# Patient Record
Sex: MALE | Race: OTHER | Employment: PART TIME | URBAN - NONMETROPOLITAN AREA
[De-identification: names, ages, dates, MRNs, and addresses within clinical notes are randomized per-mention and may not be internally consistent; named-entity substitution may affect disease eponyms.]

---

## 2017-08-05 ENCOUNTER — HOSPITAL ENCOUNTER (EMERGENCY)
Age: 47
Discharge: HOME | End: 2017-08-06
Payer: COMMERCIAL

## 2017-08-05 VITALS
TEMPERATURE: 98 F | HEART RATE: 121 BPM | DIASTOLIC BLOOD PRESSURE: 80 MMHG | OXYGEN SATURATION: 96 % | SYSTOLIC BLOOD PRESSURE: 165 MMHG | RESPIRATION RATE: 18 BRPM
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
## 2017-08-05 PROCEDURE — 99282 EMERGENCY DEPT VISIT SF MDM: CPT

## 2017-08-05 PROCEDURE — 6360000002 HC RX W HCPCS

## 2017-08-05 PROCEDURE — 96365 THER/PROPH/DIAG IV INF INIT: CPT

## 2017-08-05 PROCEDURE — 90715 TDAP VACCINE 7 YRS/> IM: CPT

## 2017-08-05 PROCEDURE — 90471 IMMUNIZATION ADMIN: CPT

## 2017-08-05 PROCEDURE — 2500000003 HC RX 250 WO HCPCS

## 2017-08-05 RX ORDER — CLINDAMYCIN PHOSPHATE 600 MG/50ML
INJECTION INTRAVENOUS
Status: DISCONTINUED
Start: 2017-08-05 | End: 2017-08-05 | Stop reason: WASHOUT

## 2017-08-05 RX ORDER — CLINDAMYCIN PHOSPHATE 900 MG/50ML
900 INJECTION INTRAVENOUS ONCE
Status: COMPLETED | OUTPATIENT
Start: 2017-08-05 | End: 2017-08-05

## 2017-08-05 RX ORDER — CLINDAMYCIN PHOSPHATE 900 MG/50ML
INJECTION INTRAVENOUS
Status: COMPLETED
Start: 2017-08-05 | End: 2017-08-05

## 2017-08-05 RX ADMIN — CLINDAMYCIN PHOSPHATE 900 MG: 900 INJECTION INTRAVENOUS at 23:27

## 2017-08-05 RX ADMIN — TETANUS TOXOID, REDUCED DIPHTHERIA TOXOID AND ACELLULAR PERTUSSIS VACCINE, ADSORBED 0.5 ML: 5; 2.5; 8; 8; 2.5 SUSPENSION INTRAMUSCULAR at 23:27

## 2017-08-05 RX ADMIN — CLINDAMYCIN PHOSPHATE 900 MG: 18 INJECTION, SOLUTION INTRAMUSCULAR; INTRAVENOUS at 23:27

## 2017-08-05 ASSESSMENT — PAIN SCALES - GENERAL: PAINLEVEL_OUTOF10: 0

## 2017-08-06 RX ORDER — CEPHALEXIN 500 MG/1
500 CAPSULE ORAL 4 TIMES DAILY
Qty: 40 CAPSULE | Refills: 0 | Status: SHIPPED | OUTPATIENT
Start: 2017-08-06 | End: 2017-08-16

## 2017-08-06 ASSESSMENT — ENCOUNTER SYMPTOMS
RHINORRHEA: 0
WHEEZING: 0
NAUSEA: 0
EYE DISCHARGE: 0
ABDOMINAL PAIN: 0
SHORTNESS OF BREATH: 0
EYE REDNESS: 0
SORE THROAT: 0
VOMITING: 0
BACK PAIN: 0
DIARRHEA: 0
COUGH: 0

## 2017-10-17 ENCOUNTER — APPOINTMENT (OUTPATIENT)
Dept: GENERAL RADIOLOGY | Age: 47
End: 2017-10-17
Payer: COMMERCIAL

## 2017-10-17 ENCOUNTER — HOSPITAL ENCOUNTER (EMERGENCY)
Age: 47
Discharge: HOME | End: 2017-10-17
Attending: EMERGENCY MEDICINE
Payer: COMMERCIAL

## 2017-10-17 ENCOUNTER — OFFICE VISIT (OUTPATIENT)
Dept: FAMILY MEDICINE CLINIC | Age: 47
End: 2017-10-17

## 2017-10-17 VITALS
TEMPERATURE: 98.3 F | WEIGHT: 162.8 LBS | RESPIRATION RATE: 14 BRPM | DIASTOLIC BLOOD PRESSURE: 68 MMHG | BODY MASS INDEX: 23.31 KG/M2 | SYSTOLIC BLOOD PRESSURE: 138 MMHG | HEIGHT: 70 IN | HEART RATE: 108 BPM

## 2017-10-17 VITALS
OXYGEN SATURATION: 99 % | BODY MASS INDEX: 25.05 KG/M2 | RESPIRATION RATE: 16 BRPM | DIASTOLIC BLOOD PRESSURE: 75 MMHG | HEART RATE: 106 BPM | WEIGHT: 175 LBS | TEMPERATURE: 97.5 F | SYSTOLIC BLOOD PRESSURE: 133 MMHG | HEIGHT: 70 IN

## 2017-10-17 DIAGNOSIS — E05.90 HYPERTHYROIDISM: ICD-10-CM

## 2017-10-17 DIAGNOSIS — A08.11 GASTROENTERITIS DUE TO NOROVIRUS: Primary | ICD-10-CM

## 2017-10-17 DIAGNOSIS — J30.1 ACUTE SEASONAL ALLERGIC RHINITIS DUE TO POLLEN: ICD-10-CM

## 2017-10-17 DIAGNOSIS — A08.11 NOROVIRUS: ICD-10-CM

## 2017-10-17 DIAGNOSIS — E05.90 HYPERTHYROIDISM: Primary | ICD-10-CM

## 2017-10-17 LAB
ADENOVIRUS F 40 41 PCR: NOT DETECTED
ALBUMIN SERPL-MCNC: 3.5 G/DL (ref 3.5–5.1)
ALP BLD-CCNC: 230 U/L (ref 38–126)
ALT SERPL-CCNC: 19 U/L (ref 11–66)
AMPHETAMINE+METHAMPHETAMINE URINE SCREEN: NEGATIVE
ANION GAP SERPL CALCULATED.3IONS-SCNC: 10 MEQ/L (ref 8–16)
AST SERPL-CCNC: 18 U/L (ref 5–40)
ASTROVIRUS PCR: NOT DETECTED
BACTERIA: ABNORMAL /HPF
BARBITURATE QUANTITATIVE URINE: NEGATIVE
BASOPHILS # BLD: 0.1 %
BASOPHILS ABSOLUTE: 0 THOU/MM3 (ref 0–0.1)
BENZODIAZEPINE QUANTITATIVE URINE: NEGATIVE
BILIRUB SERPL-MCNC: 0.6 MG/DL (ref 0.3–1.2)
BILIRUBIN DIRECT: < 0.2 MG/DL (ref 0–0.3)
BILIRUBIN URINE: NEGATIVE
BLOOD, URINE: NEGATIVE
BUN BLDV-MCNC: 8 MG/DL (ref 7–22)
CALCIUM SERPL-MCNC: 9.4 MG/DL (ref 8.5–10.5)
CAMPYLOBACTER PCR: NOT DETECTED
CANNABINOID QUANTITATIVE URINE: NEGATIVE
CASTS 2: ABNORMAL /LPF
CASTS UA: ABNORMAL /LPF
CHARACTER, URINE: CLEAR
CHLORIDE BLD-SCNC: 103 MEQ/L (ref 98–111)
CLOSTRIDIUM DIFFICILE, PCR: NOT DETECTED
CO2: 25 MEQ/L (ref 23–33)
COCAINE METABOLITE QUANTITATIVE URINE: NEGATIVE
COLOR: ABNORMAL
CREAT SERPL-MCNC: 0.6 MG/DL (ref 0.4–1.2)
CRYPTOSPORIDIUM PCR: NOT DETECTED
CRYSTALS, UA: ABNORMAL
CYCLOSPORA CAYETANENSIS PCR: NOT DETECTED
E COLI 0157 PCR: ABNORMAL
E COLI ENTEROAGGREGATIVE PCR: NOT DETECTED
E COLI ENTEROPATHOGENIC PCR: NOT DETECTED
E COLI ENTEROTOXIGENIC PCR: NOT DETECTED
E COLI SHIGA LIKE TOXIN PCR: NOT DETECTED
E COLI SHIGELLA/ENTEROINVASIVE PCR: NOT DETECTED
E HISTOLYTICA GI FILM ARRAY: NOT DETECTED
EKG ATRIAL RATE: 121 BPM
EKG P AXIS: 58 DEGREES
EKG P-R INTERVAL: 182 MS
EKG Q-T INTERVAL: 304 MS
EKG QRS DURATION: 86 MS
EKG QTC CALCULATION (BAZETT): 431 MS
EKG R AXIS: 77 DEGREES
EKG T AXIS: 55 DEGREES
EKG VENTRICULAR RATE: 121 BPM
EOSINOPHIL # BLD: 2.8 %
EOSINOPHILS ABSOLUTE: 0.2 THOU/MM3 (ref 0–0.4)
EPITHELIAL CELLS, UA: ABNORMAL /HPF
ETHYL ALCOHOL, SERUM: < 0.01 %
FLU A ANTIGEN: NEGATIVE
FLU B ANTIGEN: NEGATIVE
GFR SERPL CREATININE-BSD FRML MDRD: > 90 ML/MIN/1.73M2
GIARDIA LAMBLIA PCR: NOT DETECTED
GLUCOSE BLD-MCNC: 119 MG/DL (ref 70–108)
GLUCOSE URINE: NEGATIVE MG/DL
HCT VFR BLD CALC: 39.6 % (ref 42–52)
HEMOGLOBIN: 14 GM/DL (ref 14–18)
KETONES, URINE: NEGATIVE
LEUKOCYTE ESTERASE, URINE: NEGATIVE
LYMPHOCYTES # BLD: 15.7 %
LYMPHOCYTES ABSOLUTE: 1.1 THOU/MM3 (ref 1–4.8)
MCH RBC QN AUTO: 29.3 PG (ref 27–31)
MCHC RBC AUTO-ENTMCNC: 35.4 GM/DL (ref 33–37)
MCV RBC AUTO: 83 FL (ref 80–94)
MISCELLANEOUS 2: ABNORMAL
MONOCYTES # BLD: 13.1 %
MONOCYTES ABSOLUTE: 0.9 THOU/MM3 (ref 0.4–1.3)
NITRITE, URINE: NEGATIVE
NOROVIRUS GI GII PCR: DETECTED
NUCLEATED RED BLOOD CELLS: 0 /100 WBC
OPIATES, URINE: NEGATIVE
OSMOLALITY CALCULATION: 275.1 MOSMOL/KG (ref 275–300)
OXYCODONE: NEGATIVE
PDW BLD-RTO: 13.4 % (ref 11.5–14.5)
PH UA: 5
PHENCYCLIDINE QUANTITATIVE URINE: NEGATIVE
PLATELET # BLD: 195 THOU/MM3 (ref 130–400)
PLESIOMONAS SHIGELLOIDES PCR: NOT DETECTED
PMV BLD AUTO: 8.1 MCM (ref 7.4–10.4)
POTASSIUM SERPL-SCNC: 4.1 MEQ/L (ref 3.5–5.2)
PROTEIN UA: NEGATIVE
RBC # BLD: 4.77 MILL/MM3 (ref 4.7–6.1)
RBC # BLD: NORMAL 10*6/UL
RBC URINE: ABNORMAL /HPF
RENAL EPITHELIAL, UA: ABNORMAL
ROTAVIRUS A PCR: NOT DETECTED
SALMONELLA PCR: NOT DETECTED
SAPOVIRUS PCR: NOT DETECTED
SEDIMENTATION RATE, ERYTHROCYTE: 18 MM/HR (ref 0–10)
SEG NEUTROPHILS: 68.3 %
SEGMENTED NEUTROPHILS ABSOLUTE COUNT: 4.6 THOU/MM3 (ref 1.8–7.7)
SODIUM BLD-SCNC: 138 MEQ/L (ref 135–145)
SPECIFIC GRAVITY, URINE: 1.02 (ref 1–1.03)
T3 FREE: 21.52 PG/ML (ref 2.02–4.43)
T4 FREE: 7.06 NG/DL (ref 0.93–1.76)
TOTAL CK: 39 U/L (ref 55–170)
TOTAL PROTEIN: 6.4 G/DL (ref 6.1–8)
TSH SERPL DL<=0.05 MIU/L-ACNC: 0.01 UIU/ML (ref 0.4–4.2)
UROBILINOGEN, URINE: 0.2 EU/DL
VIBRIO CHOLERAE PCR: NOT DETECTED
VIBRIO PCR: NOT DETECTED
WBC # BLD: 6.7 THOU/MM3 (ref 4.8–10.8)
WBC UA: ABNORMAL /HPF
YEAST: ABNORMAL
YERSINIA ENTEROCOLITICA PCR: NOT DETECTED

## 2017-10-17 PROCEDURE — 84439 ASSAY OF FREE THYROXINE: CPT

## 2017-10-17 PROCEDURE — 84481 FREE ASSAY (FT-3): CPT

## 2017-10-17 PROCEDURE — 85025 COMPLETE CBC W/AUTO DIFF WBC: CPT

## 2017-10-17 PROCEDURE — 85651 RBC SED RATE NONAUTOMATED: CPT

## 2017-10-17 PROCEDURE — 99284 EMERGENCY DEPT VISIT MOD MDM: CPT

## 2017-10-17 PROCEDURE — 74000 XR ABDOMEN LIMITED (KUB): CPT

## 2017-10-17 PROCEDURE — 93005 ELECTROCARDIOGRAM TRACING: CPT

## 2017-10-17 PROCEDURE — 96374 THER/PROPH/DIAG INJ IV PUSH: CPT

## 2017-10-17 PROCEDURE — 87507 IADNA-DNA/RNA PROBE TQ 12-25: CPT

## 2017-10-17 PROCEDURE — 84445 ASSAY OF TSI GLOBULIN: CPT

## 2017-10-17 PROCEDURE — 82550 ASSAY OF CK (CPK): CPT

## 2017-10-17 PROCEDURE — 99203 OFFICE O/P NEW LOW 30 MIN: CPT | Performed by: EMERGENCY MEDICINE

## 2017-10-17 PROCEDURE — 96361 HYDRATE IV INFUSION ADD-ON: CPT

## 2017-10-17 PROCEDURE — 80307 DRUG TEST PRSMV CHEM ANLYZR: CPT

## 2017-10-17 PROCEDURE — 2580000003 HC RX 258: Performed by: PHYSICIAN ASSISTANT

## 2017-10-17 PROCEDURE — 96375 TX/PRO/DX INJ NEW DRUG ADDON: CPT

## 2017-10-17 PROCEDURE — 93005 ELECTROCARDIOGRAM TRACING: CPT | Performed by: EMERGENCY MEDICINE

## 2017-10-17 PROCEDURE — G0480 DRUG TEST DEF 1-7 CLASSES: HCPCS

## 2017-10-17 PROCEDURE — 81001 URINALYSIS AUTO W/SCOPE: CPT

## 2017-10-17 PROCEDURE — 84443 ASSAY THYROID STIM HORMONE: CPT

## 2017-10-17 PROCEDURE — 87804 INFLUENZA ASSAY W/OPTIC: CPT

## 2017-10-17 PROCEDURE — 80053 COMPREHEN METABOLIC PANEL: CPT

## 2017-10-17 PROCEDURE — 36415 COLL VENOUS BLD VENIPUNCTURE: CPT

## 2017-10-17 PROCEDURE — 2500000003 HC RX 250 WO HCPCS: Performed by: PHYSICIAN ASSISTANT

## 2017-10-17 PROCEDURE — 6360000002 HC RX W HCPCS: Performed by: PHYSICIAN ASSISTANT

## 2017-10-17 PROCEDURE — 82248 BILIRUBIN DIRECT: CPT

## 2017-10-17 PROCEDURE — 73564 X-RAY EXAM KNEE 4 OR MORE: CPT

## 2017-10-17 RX ORDER — METOPROLOL TARTRATE 50 MG/1
25 TABLET, FILM COATED ORAL ONCE
Status: DISCONTINUED | OUTPATIENT
Start: 2017-10-17 | End: 2017-10-17

## 2017-10-17 RX ORDER — 0.9 % SODIUM CHLORIDE 0.9 %
1000 INTRAVENOUS SOLUTION INTRAVENOUS ONCE
Status: COMPLETED | OUTPATIENT
Start: 2017-10-17 | End: 2017-10-17

## 2017-10-17 RX ORDER — KETOROLAC TROMETHAMINE 30 MG/ML
30 INJECTION, SOLUTION INTRAMUSCULAR; INTRAVENOUS ONCE
Status: COMPLETED | OUTPATIENT
Start: 2017-10-17 | End: 2017-10-17

## 2017-10-17 RX ORDER — METOPROLOL TARTRATE 5 MG/5ML
5 INJECTION INTRAVENOUS ONCE
Status: COMPLETED | OUTPATIENT
Start: 2017-10-17 | End: 2017-10-17

## 2017-10-17 RX ADMIN — KETOROLAC TROMETHAMINE 30 MG: 30 INJECTION, SOLUTION INTRAMUSCULAR at 09:49

## 2017-10-17 RX ADMIN — SODIUM CHLORIDE 1000 ML: 9 INJECTION, SOLUTION INTRAVENOUS at 06:51

## 2017-10-17 RX ADMIN — METOPROLOL TARTRATE 5 MG: 5 INJECTION INTRAVENOUS at 15:03

## 2017-10-17 ASSESSMENT — ENCOUNTER SYMPTOMS
TROUBLE SWALLOWING: 0
VOMITING: 0
EYE REDNESS: 0
EYE DISCHARGE: 0
SHORTNESS OF BREATH: 0
SINUS PRESSURE: 0
SORE THROAT: 0
SHORTNESS OF BREATH: 0
NAUSEA: 0
WHEEZING: 0
SORE THROAT: 0
BACK PAIN: 0
NAUSEA: 0
COUGH: 0
ABDOMINAL PAIN: 0
CHEST TIGHTNESS: 0
WHEEZING: 0
BACK PAIN: 0
VOICE CHANGE: 0
RHINORRHEA: 0
DIARRHEA: 1
DIARRHEA: 1
ABDOMINAL PAIN: 0
VOMITING: 0
COUGH: 0
CONSTIPATION: 0
RHINORRHEA: 0

## 2017-10-17 ASSESSMENT — PAIN SCALES - GENERAL
PAINLEVEL_OUTOF10: 9
PAINLEVEL_OUTOF10: 6
PAINLEVEL_OUTOF10: 4

## 2017-10-17 ASSESSMENT — PAIN DESCRIPTION - DESCRIPTORS: DESCRIPTORS: ACHING

## 2017-10-17 ASSESSMENT — PATIENT HEALTH QUESTIONNAIRE - PHQ9
2. FEELING DOWN, DEPRESSED OR HOPELESS: 0
SUM OF ALL RESPONSES TO PHQ QUESTIONS 1-9: 0
1. LITTLE INTEREST OR PLEASURE IN DOING THINGS: 0
SUM OF ALL RESPONSES TO PHQ9 QUESTIONS 1 & 2: 0

## 2017-10-17 ASSESSMENT — PAIN DESCRIPTION - ORIENTATION: ORIENTATION: RIGHT

## 2017-10-17 ASSESSMENT — PAIN DESCRIPTION - PAIN TYPE: TYPE: ACUTE PAIN

## 2017-10-17 ASSESSMENT — PAIN DESCRIPTION - LOCATION: LOCATION: KNEE

## 2017-10-17 NOTE — ED NOTES
Pt voided, no cath done d/t urine being collected via clean catch.       Cornelius Mantilla RN  10/17/17 7583

## 2017-10-17 NOTE — PROGRESS NOTES
Medications Prescribed:  No orders of the defined types were placed in this encounter. Orders Placed:  Orders Placed This Encounter   Procedures    US THYROID     Standing Status:   Future     Standing Expiration Date:   10/17/2018    NM Thyroid Uptake and Scan     Standing Status:   Future     Standing Expiration Date:   10/17/2018       Results of Laboratory tests taken at ER were reviewed with the patient. Discussed with the patient regarding the viral gastroenteritis which is self-limiting. Patient can take over-the-counter Imodium      Return in about 2 weeks (around 11/2/2017), or Diarrhea, hyperthyroid. Discussed use, benefit, and side effects of prescribed medications. All patient questions answered. Pt voiced understanding. Instructed to continue current medications, diet and exercise. Patient agreed with treatment plan.

## 2017-10-17 NOTE — ED NOTES
Pt updated on current lab results and plan of care per Aravind Armenta PA-C. No questions or concerns voiced at this time. Will continue to monitor.       James Larson RN  10/17/17 4872

## 2017-10-17 NOTE — LETTER
[facility] EMERGENCY DEPT  [address] [facility] [address]  [place] [address]  Phone: [phone]             October 17, 2017    Patient: Elissa Bethea   YOB: 1970   Date of Visit: 10/17/2017       To Whom It May Concern:    Chad Mays was seen and treated in our emergency department on 10/17/2017. He may return to work on 10/18/2017.       Sincerely,       Princess Burton RN         Signature:__________________________________

## 2017-10-17 NOTE — ED NOTES
Pt updated on current plan of care. Medications administered per provider orders. Will continue to monitor.      Lyndsay Mcallister RN  10/17/17 4454

## 2017-10-17 NOTE — ED NOTES
Discharge instructions reviewed, scripts given, follow-up discussed. Pt verbalized understanding.         Rosalba Cardenas RN  10/17/17 3826

## 2017-10-17 NOTE — ED PROVIDER NOTES
[facility]  eMERGENCY dEPARTMENT eNCOUnter          CHIEF COMPLAINT       Chief Complaint   Patient presents with    Generalized Body Aches    Diarrhea       Nurses Notes reviewed and I agree except as noted in the HPI. HISTORY OF PRESENT Hugo Macedo is a 40 y.o. male who presents to the Emergency Department for the evaluation of generalized body aches. Patient states that since yesterday afternoon he has been having generalized body aches, diarrhea, and subjective fever. He states that his episodes of diarrhea are frequent, occurring every 10 minute yesterday. States it is brown and watery, denies any mucousy or bloody component. He states that he took some imodium for his diarrhea but had no relief. He states that his pain is at a 9/10 in severity, localized to the extremity, he denies any abdominal pain. He states that his pain is aching. Denies any documented fever, chills, chest pain, shortness of breath, rhinorrhea, sore throat, cough, nausea, vomiting, recent antibiotic use, dietary changes or sick contacts. Denies any medication changes. He also notes that for the past few weeks he has been having right knee pain he describes as aching after accidentally hitting his knee on his front door at home. It worsens with movement. He states that he consumed two ham sandwiches, and a blueberry muffin yesterday. Denies any weight changes, palpitations, tremors, activity changes, temperature sensitivity      The HPI was provided by the patient. REVIEW OF SYSTEMS     Review of Systems   Constitutional: Positive for fever (subjective). Negative for appetite change, chills and fatigue. HENT: Negative for congestion, ear pain, rhinorrhea and sore throat. Eyes: Negative for discharge, redness and visual disturbance. Respiratory: Negative for cough, shortness of breath and wheezing. Cardiovascular: Negative for chest pain, palpitations and leg swelling. Gastrointestinal: Positive for diarrhea. Negative for abdominal pain, nausea and vomiting. Genitourinary: Negative for decreased urine volume, difficulty urinating and dysuria. Musculoskeletal: Positive for arthralgias and myalgias. Negative for back pain, joint swelling and neck pain. Skin: Negative for pallor and rash. Allergic/Immunologic: Negative for environmental allergies. Neurological: Negative for dizziness, syncope, weakness, light-headedness and headaches. Hematological: Negative for adenopathy. Psychiatric/Behavioral: Negative for agitation, confusion, dysphoric mood and suicidal ideas. The patient is not nervous/anxious. PAST MEDICAL HISTORY    has a past medical history of Gout. SURGICAL HISTORY      has a past surgical history that includes shoulder surgery. CURRENT MEDICATIONS       Discharge Medication List as of 10/17/2017  2:38 PM      CONTINUE these medications which have NOT CHANGED    Details   miconazole (MICOTIN) 2 % cream Apply topically 2 times daily. , Disp-92 g, R-0, Print      ibuprofen (ADVIL;MOTRIN) 800 MG tablet Take 1 tablet by mouth every 8 hours as needed for Pain, Disp-15 tablet, R-0      oxyCODONE-acetaminophen (PERCOCET) 5-325 MG per tablet Take 1 tablet by mouth every 4 hours as needed for Pain, Disp-12 tablet, R-0      indomethacin (INDOCIN) 50 MG capsule Take 1 capsule by mouth 3 times daily (with meals), Disp-30 capsule, R-0      allopurinol (ZYLOPRIM) 100 MG tablet Take 1 tablet by mouth daily. , Disp-30 tablet, R-0             ALLERGIES     has No Known Allergies. FAMILY HISTORY     indicated that his mother is alive. He indicated that the status of his father is unknown.    family history includes Diabetes in his mother; Mental Illness in his mother. SOCIAL HISTORY      reports that he has never smoked. He has never used smokeless tobacco. He reports that he does not drink alcohol or use drugs.     PHYSICAL EXAM     INITIAL VITALS:  height is 5' 10\" (1.778 m) and weight is 175 lb (79.4 kg). His oral temperature is 97.5 °F (36.4 °C). His blood pressure is 133/75 and his pulse is 106. His respiration is 16 and oxygen saturation is 99%. Physical Exam   Constitutional: He is oriented to person, place, and time. He appears well-developed and well-nourished. HENT:   Head: Normocephalic and atraumatic. Right Ear: External ear normal.   Left Ear: External ear normal.   Eyes: Conjunctivae are normal. Right eye exhibits no discharge. Left eye exhibits no discharge. No scleral icterus. No proptosis   Neck: Normal range of motion. Neck supple. No JVD present. Perhaps right greater than left mild thyroidomegaly no discrete masses, nodule or goiters are palpated on exam.  Thyroid is nontender. Cardiovascular: Regular rhythm and normal heart sounds. Tachycardia present. Exam reveals no gallop and no friction rub. No murmur heard. Pulses:       Dorsalis pedis pulses are 2+ on the right side, and 2+ on the left side. Pulmonary/Chest: Effort normal and breath sounds normal. No respiratory distress. He has no decreased breath sounds. He has no wheezes. He has no rhonchi. He has no rales. Abdominal: Soft. Bowel sounds are normal. He exhibits no distension. There is no tenderness. There is no rebound and no guarding. Musculoskeletal: Normal range of motion. He exhibits no edema. Right knee: He exhibits normal range of motion, no swelling, no effusion, no ecchymosis, no deformity, no laceration, no erythema, normal alignment and no bony tenderness. No tenderness found. No medial joint line, no lateral joint line and no patellar tendon tenderness noted.         Right upper arm: Normal.        Left upper arm: Normal.        Right forearm: Normal.        Left forearm: Normal.        Right upper leg: Normal.        Left upper leg: Normal.        Right lower leg: Normal.        Left lower leg: Normal.   Right knee pain localized to medial joint reveal any evidence of dehydration, thyroid function ordered. This does reveal some fairly significant hyperthyroidism. Of note, patient was diaphoretic at rest in the department, tachycardic. Second liter bolus does provide further improvement in the tachycardia but he still remained tachycardic in the low 100s at rest.  He is given Toradol with improvement in body aches. Case is discussed with Dr. Erin Guillaume of endocrinology. He recommends admission, cooling the patient and possibly starting beta blockers if his tachycardia persists. He would like nuclear thyroid scan to be initiated as well as sedimentation rate, free T3 and thyroid stimulating immunoglobulin. These are ordered. I did discuss the case with Dr. Pinky Romeo, the patient's primary care provider. Patient is unable to arrange for  for his two sons, of whom he has sole custody. He is very resistant to admission and Dr. Pinky Romeo see the patient in his office immediately after discharge to initiate aggressive outpatient workup. Patient was educated of the risks associated with the plan of discharge including dysrhythmias, heart failure and potential death. He was agreeable with discharge, agrees to return for worsening of symptoms. He was given a dose of metoprolol in the department to discharge and provide outpatient prescriptions per Dr. Pniky Romeo. Medications   0.9 % sodium chloride bolus (0 mLs Intravenous Stopped 10/17/17 0751)   ketorolac (TORADOL) injection 30 mg (30 mg Intravenous Given 10/17/17 0949)   metoprolol (LOPRESSOR) injection 5 mg (5 mg Intravenous Given 10/17/17 1503)       CRITICAL CARE:   None     CONSULTS:   Dr. Erin Guillaume, endocrinology  Dr. Pinky Romeo, PCP    PROCEDURES:  None    FINAL IMPRESSION      1. Hyperthyroidism    2.  Norovirus          DISPOSITION/PLAN     discharge    PATIENT REFERRED TO:  Velas[facility], [address] [facility] [address]  [phone]      GO IMMEDIATELY TO HIS OFFICE    Nancy Prabhakar

## 2017-10-18 NOTE — ED PROVIDER NOTES
I had no contact with pt, PA did a curbside consult on disposition based on chief complaint, I recommended admission.       Navdeep Murphy, DO  10/18/17 [address] Cyndi NICHOLAS, DO  10/18/17 2189

## 2017-10-19 ENCOUNTER — TELEPHONE (OUTPATIENT)
Dept: FAMILY MEDICINE CLINIC | Age: 47
End: 2017-10-19

## 2017-10-20 LAB — THYROID STIMULATING IMMUNOGLOBULIN: 479 %

## 2017-10-23 ENCOUNTER — HOSPITAL ENCOUNTER (OUTPATIENT)
Dept: ULTRASOUND IMAGING | Age: 47
Discharge: HOME | End: 2017-10-23
Payer: COMMERCIAL

## 2017-10-23 DIAGNOSIS — E05.90 HYPERTHYROIDISM: ICD-10-CM

## 2017-10-23 PROCEDURE — 76536 US EXAM OF HEAD AND NECK: CPT

## 2017-10-25 ENCOUNTER — HOSPITAL ENCOUNTER (OUTPATIENT)
Dept: NUCLEAR MEDICINE | Age: 47
Discharge: HOME | End: 2017-10-25
Payer: COMMERCIAL

## 2017-10-25 ENCOUNTER — TELEPHONE (OUTPATIENT)
Dept: FAMILY MEDICINE CLINIC | Age: 47
End: 2017-10-25

## 2017-10-25 DIAGNOSIS — E01.0 THYROMEGALY: Primary | ICD-10-CM

## 2017-10-25 DIAGNOSIS — E05.90 HYPERTHYROIDISM: ICD-10-CM

## 2017-10-25 PROCEDURE — 3430000000 HC RX DIAGNOSTIC RADIOPHARMACEUTICAL: Performed by: EMERGENCY MEDICINE

## 2017-10-25 PROCEDURE — A9516 IODINE I-123 SOD IODIDE MIC: HCPCS | Performed by: EMERGENCY MEDICINE

## 2017-10-25 RX ADMIN — Medication 400 MICRO CURIE: at 09:12

## 2017-10-26 ENCOUNTER — HOSPITAL ENCOUNTER (OUTPATIENT)
Dept: NUCLEAR MEDICINE | Age: 47
Discharge: HOME | End: 2017-10-26
Payer: COMMERCIAL

## 2017-10-26 PROCEDURE — 78014 THYROID IMAGING W/BLOOD FLOW: CPT

## 2017-10-31 ENCOUNTER — HOSPITAL ENCOUNTER (INPATIENT)
Age: 47
LOS: 2 days | Discharge: HOME | DRG: 427 | End: 2017-11-02
Attending: EMERGENCY MEDICINE | Admitting: EMERGENCY MEDICINE
Payer: COMMERCIAL

## 2017-10-31 ENCOUNTER — OFFICE VISIT (OUTPATIENT)
Dept: FAMILY MEDICINE CLINIC | Age: 47
End: 2017-10-31

## 2017-10-31 VITALS
WEIGHT: 160.38 LBS | DIASTOLIC BLOOD PRESSURE: 60 MMHG | RESPIRATION RATE: 14 BRPM | HEIGHT: 70 IN | SYSTOLIC BLOOD PRESSURE: 126 MMHG | BODY MASS INDEX: 22.96 KG/M2 | HEART RATE: 96 BPM

## 2017-10-31 DIAGNOSIS — E05.00 GRAVES DISEASE: ICD-10-CM

## 2017-10-31 DIAGNOSIS — E05.90 HYPERTHYROIDISM: ICD-10-CM

## 2017-10-31 DIAGNOSIS — E05.01 GRAVES' DISEASE WITH THYROTOXIC CRISIS: Primary | ICD-10-CM

## 2017-10-31 PROCEDURE — G0378 HOSPITAL OBSERVATION PER HR: HCPCS

## 2017-10-31 PROCEDURE — 1200000003 HC TELEMETRY R&B

## 2017-10-31 PROCEDURE — 6370000000 HC RX 637 (ALT 250 FOR IP): Performed by: EMERGENCY MEDICINE

## 2017-10-31 PROCEDURE — 96360 HYDRATION IV INFUSION INIT: CPT

## 2017-10-31 PROCEDURE — 2580000003 HC RX 258: Performed by: EMERGENCY MEDICINE

## 2017-10-31 RX ORDER — DIPHENHYDRAMINE HCL 25 MG
25 CAPSULE ORAL DAILY PRN
COMMUNITY

## 2017-10-31 RX ORDER — SODIUM CHLORIDE 0.9 % (FLUSH) 0.9 %
10 SYRINGE (ML) INJECTION EVERY 12 HOURS SCHEDULED
Status: DISCONTINUED | OUTPATIENT
Start: 2017-10-31 | End: 2017-11-02 | Stop reason: HOSPADM

## 2017-10-31 RX ORDER — ONDANSETRON 2 MG/ML
4 INJECTION INTRAMUSCULAR; INTRAVENOUS EVERY 6 HOURS PRN
Status: DISCONTINUED | OUTPATIENT
Start: 2017-10-31 | End: 2017-11-02 | Stop reason: HOSPADM

## 2017-10-31 RX ORDER — SODIUM CHLORIDE 0.9 % (FLUSH) 0.9 %
10 SYRINGE (ML) INJECTION PRN
Status: DISCONTINUED | OUTPATIENT
Start: 2017-10-31 | End: 2017-11-02 | Stop reason: HOSPADM

## 2017-10-31 RX ORDER — SODIUM CHLORIDE 9 MG/ML
INJECTION, SOLUTION INTRAVENOUS CONTINUOUS
Status: DISCONTINUED | OUTPATIENT
Start: 2017-10-31 | End: 2017-11-02 | Stop reason: HOSPADM

## 2017-10-31 RX ORDER — ACETAMINOPHEN 325 MG/1
650 TABLET ORAL EVERY 4 HOURS PRN
Status: DISCONTINUED | OUTPATIENT
Start: 2017-10-31 | End: 2017-11-02 | Stop reason: HOSPADM

## 2017-10-31 RX ORDER — METHIMAZOLE 10 MG/1
20 TABLET ORAL DAILY
Status: DISCONTINUED | OUTPATIENT
Start: 2017-10-31 | End: 2017-11-02 | Stop reason: HOSPADM

## 2017-10-31 RX ORDER — 0.9 % SODIUM CHLORIDE 0.9 %
500 INTRAVENOUS SOLUTION INTRAVENOUS ONCE
Status: COMPLETED | OUTPATIENT
Start: 2017-10-31 | End: 2017-10-31

## 2017-10-31 RX ADMIN — METHIMAZOLE 20 MG: 10 TABLET ORAL at 21:48

## 2017-10-31 RX ADMIN — Medication 10 ML: at 20:40

## 2017-10-31 RX ADMIN — SODIUM CHLORIDE: 9 INJECTION, SOLUTION INTRAVENOUS at 22:31

## 2017-10-31 RX ADMIN — SODIUM CHLORIDE 500 ML: 9 INJECTION, SOLUTION INTRAVENOUS at 20:50

## 2017-10-31 RX ADMIN — METOPROLOL TARTRATE 37.5 MG: 25 TABLET ORAL at 21:45

## 2017-10-31 ASSESSMENT — ENCOUNTER SYMPTOMS
SORE THROAT: 0
VOICE CHANGE: 0
CHEST TIGHTNESS: 0
BACK PAIN: 0
VOMITING: 0
RHINORRHEA: 0
SHORTNESS OF BREATH: 0
COUGH: 0
NAUSEA: 0
CONSTIPATION: 0
SINUS PRESSURE: 0
DIARRHEA: 0
WHEEZING: 0
TROUBLE SWALLOWING: 0
ABDOMINAL PAIN: 0

## 2017-10-31 ASSESSMENT — PAIN SCALES - GENERAL: PAINLEVEL_OUTOF10: 0

## 2017-10-31 NOTE — PLAN OF CARE
Problem: Pain Control  Goal: Maintain pain level at or below patient's acceptable level (or 5 if patient is unable to determine acceptable level)  Outcome: Met This Shift      Problem: Neurological  Goal: Maximum potential motor/sensory/cognitive function  Outcome: Ongoing  Has palpitations at times. Tremors of legs and arms radha times. Able to maintain job and family. Problem: Cardiovascular  Goal: No DVT, peripheral vascular complications  Outcome: Met This Shift    Goal: Hemodynamic stability  Outcome: Met This Shift    Goal: Weight maintained or lost  Outcome: Ongoing  Has lost approximately 27 lbs since May this year. States very good appetite, \"hungry all the time\"    Problem: GI  Goal: No bowel complications  Outcome: Met This Shift    Goal: Bowel movement at least every other day  Outcome: Met This Shift      Problem:   Goal: Adequate urinary output  Outcome: Met This Shift      Problem: Nutrition  Goal: Optimal nutrition therapy  Outcome: Ongoing  Has good appetite but disease process has made him drop 27 lbs since May this year. Problem: Skin Integrity/Risk  Goal: No skin breakdown during hospitalization  Outcome: Met This Shift      Problem: Musculor/Skeletal Functional Status  Goal: Highest potential functional level  Outcome: Ongoing  \"weak from tremors in arms and legs\" at times. Problem: Falls - Risk of:  Goal: Will remain free from falls  Will remain free from falls  Outcome: Ongoing  Hourly rounds made. Call light kept within reach. Bed alarm in use. Comments: Care plan reviewed with patient. Patient verbalize understanding of the plan of care and contribute to goal setting.

## 2017-10-31 NOTE — PROGRESS NOTES
Family Medicine Progress Notes/Coverage    Today's Date: 10/31/17  4K-26/026-A  Medical Record # [id number]  Account # [de-identified]      Mr. Gregoria Dolan admitted on 10/31/2017        Subjective / Interval History :     Not sleeping well awake all night, feel nausea     Feels tired shaky inside.  Losing weight patient feel achy however no fever, no     Had pain on the left side of face    Reviewed notes, consults, laboratory and radiology results,    Objective:       Physical Exam:  Patient Vitals for the past 24 hrs:   BP Temp Temp src Pulse Resp SpO2 Height Weight   10/31/17 1652 (!) 142/72 - - - - - - -   10/31/17 1649 (!) 143/79 98.7 °F (37.1 °C) Oral 112 16 98 % 5' 10\" (1.778 m) 160 lb 4.8 oz (72.7 kg)       General Appearance:  Alert, cooperative, no distress, appears stated age   HEENT:  Neck: Mild puffiness and tenderness on the  Left parotid area  Positive thyromegaly with mild tenderness   Chest/Lungs:  Heart: Clear to auscultation  Tachycardic, no murmur   Abdomen:  Back: Flat soft nontender  No costovertebral angle tenderness   Extremities:  Neurological Exam: No edema  Within normal limits       Assessment:     Active Hospital Problems    Diagnosis Date Noted   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] 10/25/2017     Priority: High    Hyperthyroidism [E05.90] 10/25/2017     Priority: High       Plan:     Discussed plans with the nursing staff  Admitted, consult endocrinology, started on Tapazole, increase metoprolol      Medications, Laboratories and Imaging results:    Scheduled Meds:   metoprolol tartrate  37.5 mg Oral BID    sodium chloride  500 mL Intravenous Once    methimazole  20 mg Oral Daily    sodium chloride flush  10 mL Intravenous 2 times per day     Continuous Infusions:   sodium chloride       PRN Meds:sodium chloride flush, acetaminophen, ondansetron    Imaging:    Lab Review :    Lab Results   Component Value Date    WBC 6.7 10/17/2017    HGB 14.0 10/17/2017    HCT 39.6 (L) 10/17/2017    MCV 83.0 10/17/2017     10/17/2017     Lab Results   Component Value Date    CREATININE 0.6 10/17/2017    BUN 8 10/17/2017     10/17/2017    K 4.1 10/17/2017     10/17/2017    CO2 25 10/17/2017     Lab Results   Component Value Date    CKTOTAL 39 (L) 10/17/2017     Lab Results   Component Value Date    ALT 19 10/17/2017    AST 18 10/17/2017    ALKPHOS 230 (H) 10/17/2017    BILITOT 0.6 10/17/2017     No results found for: LIPASE      Electronically signed by Vince Murrieta MD on 10/31/17 at 6:19 PM                                                                                           Sha Gavin M.D.

## 2017-11-01 LAB
ALBUMIN SERPL-MCNC: 3.2 G/DL (ref 3.5–5.1)
ALP BLD-CCNC: 240 U/L (ref 38–126)
ALT SERPL-CCNC: 23 U/L (ref 11–66)
ANION GAP SERPL CALCULATED.3IONS-SCNC: 10 MEQ/L (ref 8–16)
AST SERPL-CCNC: 21 U/L (ref 5–40)
BASOPHILS # BLD: 0.2 %
BASOPHILS ABSOLUTE: 0 THOU/MM3 (ref 0–0.1)
BILIRUB SERPL-MCNC: 0.4 MG/DL (ref 0.3–1.2)
BUN BLDV-MCNC: 10 MG/DL (ref 7–22)
CALCIUM SERPL-MCNC: 9.4 MG/DL (ref 8.5–10.5)
CHLORIDE BLD-SCNC: 104 MEQ/L (ref 98–111)
CO2: 27 MEQ/L (ref 23–33)
CREAT SERPL-MCNC: 0.5 MG/DL (ref 0.4–1.2)
EOSINOPHIL # BLD: 2.8 %
EOSINOPHILS ABSOLUTE: 0.2 THOU/MM3 (ref 0–0.4)
GFR SERPL CREATININE-BSD FRML MDRD: > 90 ML/MIN/1.73M2
GLUCOSE BLD-MCNC: 242 MG/DL (ref 70–108)
HCT VFR BLD CALC: 39.3 % (ref 42–52)
HEMOGLOBIN: 13.5 GM/DL (ref 14–18)
LYMPHOCYTES # BLD: 33.7 %
LYMPHOCYTES ABSOLUTE: 1.8 THOU/MM3 (ref 1–4.8)
MCH RBC QN AUTO: 28.7 PG (ref 27–31)
MCHC RBC AUTO-ENTMCNC: 34.3 GM/DL (ref 33–37)
MCV RBC AUTO: 83.5 FL (ref 80–94)
MONOCYTES # BLD: 13.4 %
MONOCYTES ABSOLUTE: 0.7 THOU/MM3 (ref 0.4–1.3)
NUCLEATED RED BLOOD CELLS: 0 /100 WBC
PDW BLD-RTO: 12.8 % (ref 11.5–14.5)
PLATELET # BLD: 198 THOU/MM3 (ref 130–400)
PMV BLD AUTO: 8 MCM (ref 7.4–10.4)
POTASSIUM SERPL-SCNC: 3.9 MEQ/L (ref 3.5–5.2)
RBC # BLD: 4.7 MILL/MM3 (ref 4.7–6.1)
SEG NEUTROPHILS: 49.9 %
SEGMENTED NEUTROPHILS ABSOLUTE COUNT: 2.7 THOU/MM3 (ref 1.8–7.7)
SODIUM BLD-SCNC: 141 MEQ/L (ref 135–145)
T3 FREE: 18.25 PG/ML (ref 2.02–4.43)
T4 FREE: 5.81 NG/DL (ref 0.93–1.76)
TOTAL PROTEIN: 5.7 G/DL (ref 6.1–8)
TSH SERPL DL<=0.05 MIU/L-ACNC: 0.01 UIU/ML (ref 0.4–4.2)
WBC # BLD: 5.4 THOU/MM3 (ref 4.8–10.8)

## 2017-11-01 PROCEDURE — 99253 IP/OBS CNSLTJ NEW/EST LOW 45: CPT | Performed by: NURSE PRACTITIONER

## 2017-11-01 PROCEDURE — 1200000003 HC TELEMETRY R&B

## 2017-11-01 PROCEDURE — 84439 ASSAY OF FREE THYROXINE: CPT

## 2017-11-01 PROCEDURE — G0378 HOSPITAL OBSERVATION PER HR: HCPCS

## 2017-11-01 PROCEDURE — 80050 GENERAL HEALTH PANEL: CPT

## 2017-11-01 PROCEDURE — 84481 FREE ASSAY (FT-3): CPT

## 2017-11-01 PROCEDURE — 6370000000 HC RX 637 (ALT 250 FOR IP): Performed by: EMERGENCY MEDICINE

## 2017-11-01 PROCEDURE — 36415 COLL VENOUS BLD VENIPUNCTURE: CPT

## 2017-11-01 PROCEDURE — 2580000003 HC RX 258: Performed by: EMERGENCY MEDICINE

## 2017-11-01 RX ORDER — METOPROLOL TARTRATE 50 MG/1
50 TABLET, FILM COATED ORAL 2 TIMES DAILY
Status: DISCONTINUED | OUTPATIENT
Start: 2017-11-01 | End: 2017-11-02 | Stop reason: HOSPADM

## 2017-11-01 RX ORDER — LORAZEPAM 1 MG/1
1 TABLET ORAL NIGHTLY
Status: DISCONTINUED | OUTPATIENT
Start: 2017-11-01 | End: 2017-11-02 | Stop reason: HOSPADM

## 2017-11-01 RX ADMIN — SODIUM CHLORIDE: 9 INJECTION, SOLUTION INTRAVENOUS at 03:40

## 2017-11-01 RX ADMIN — LORAZEPAM 1 MG: 1 TABLET ORAL at 20:01

## 2017-11-01 RX ADMIN — METHIMAZOLE 20 MG: 10 TABLET ORAL at 08:53

## 2017-11-01 RX ADMIN — METOPROLOL TARTRATE 37.5 MG: 25 TABLET ORAL at 08:53

## 2017-11-01 RX ADMIN — SODIUM CHLORIDE: 9 INJECTION, SOLUTION INTRAVENOUS at 13:22

## 2017-11-01 RX ADMIN — METOPROLOL TARTRATE 50 MG: 50 TABLET, FILM COATED ORAL at 20:01

## 2017-11-01 ASSESSMENT — PAIN SCALES - GENERAL
PAINLEVEL_OUTOF10: 0

## 2017-11-01 NOTE — FLOWSHEET NOTE
10/31/17 2021   Provider Notification   Reason for Communication Evaluate   Provider Name Dr. Willam Phillips   Provider Notification Physician   Method of Communication Call   Response Waiting for response   Notification Time 2020     Pharmacy called and wanted verification that the physician intended to increase Lopressor dosage from 25mg to 37.5. Most recent blood pressure 127/66. Spoke with answering service. Dr. Leahy Eye on call.

## 2017-11-01 NOTE — PROGRESS NOTES
Patient lying in bed, watching tv. Reported off to primary nurseMiesha. Overhead and call light within reach.

## 2017-11-01 NOTE — PROGRESS NOTES
Family Medicine Progress Notes/Coverage    Today's Date: 11/1/17  4K-26/026-A  Medical Record # [id number]  Account # [de-identified]      Mr. Raven Marshall admitted on 10/31/2017        Subjective / Interval History :     Slept only 1 hour last night  Feels little better  Reviewed notes, consults, laboratory and radiology results,    Objective:       Physical Exam:  Patient Vitals for the past 24 hrs:   BP Temp Temp src Pulse Resp SpO2 Height Weight   11/01/17 0853 134/64 - - 110 - - - -   11/01/17 0730 (!) 152/72 98.1 °F (36.7 °C) Oral 116 18 97 % - -   11/01/17 0353 (!) 141/75 97.6 °F (36.4 °C) Oral 93 16 98 % - 157 lb 8 oz (71.4 kg)   10/31/17 2248 118/70 98 °F (36.7 °C) Oral 97 16 98 % - -   10/31/17 2145 129/62 - - 106 - - - -   10/31/17 1957 127/66 98.1 °F (36.7 °C) Oral 109 16 98 % - -   10/31/17 1652 (!) 142/72 - - - - - - -   10/31/17 1649 (!) 143/79 98.7 °F (37.1 °C) Oral 112 16 98 % 5' 10\" (1.778 m) 160 lb 4.8 oz (72.7 kg)       General Appearance:  Alert, cooperative, no distress, appears stated age   [de-identified]:  Neck:      Chest/Lungs:  Heart: CTA  tachycardic   Abdomen:  Back: Soft non  Tender     Extremities:  Neurological Exam: No edema  WNL       Assessment:     Active Hospital Problems    Diagnosis Date Noted   Judithann Kappa' disease with thyrotoxic crisis [E05.01] 10/25/2017     Priority: High    Hyperthyroidism [E05.90] 10/25/2017     Priority: High       Plan:     Discussed plans with the nursing staff  Ativan for sleep tonight    Medications, Laboratories and Imaging results:    Scheduled Meds:   metoprolol tartrate  37.5 mg Oral BID    methimazole  20 mg Oral Daily    sodium chloride flush  10 mL Intravenous 2 times per day     Continuous Infusions:   sodium chloride 100 mL/hr at 11/01/17 0340     PRN Meds:sodium chloride flush, acetaminophen,

## 2017-11-01 NOTE — PROGRESS NOTES
Nutrition Assessment    Type and Reason for Visit: Initial, Positive Nutrition Screen (Unintentional Wt Loss)    Nutrition Recommendations: Continue General Diet. Pt to eat at least 6 meals/day d/t increased hunger until thyroid labs stabilize. Start Ensure [place] with meals. Recommend Daily MVI. Malnutrition Assessment:  · Malnutrition Status: Meets the criteria for severe malnutrition  · Context: Acute illness or injury  · Findings of the 6 clinical characteristics of malnutrition (Minimum of 2 out of 6 clinical characteristics is required to make the diagnosis of moderate or severe Protein Calorie Malnutrition based on AND/ASPEN Guidelines):  1. Energy Intake-Greater than 75%,      2. Weight Loss-5% loss or greater,  (in 2 weeks)  3. Fat Loss-Moderate subcutaneous fat loss, Orbital, Triceps  4. Muscle Loss-Moderate muscle mass loss, Clavicles (pectoralis and deltoids), Thigh (quadriceps), Calf (gastrocnemius)    Nutrition Diagnosis:   · Problem: Severe malnutrition  · Etiology:   due to pt's metabolic changes with thyroid     Signs and symptoms:  as evidenced by BMI, Weight loss greater than or equal to 5% in 1 month, Moderate loss of subcutaneous fat, Moderate muscle loss    Nutrition Assessment:  · Subjective Assessment: Pt admitted with Graves' disease with thyrotoxic crisis. Pt has hx of Gout, Hyperthyroidism. Pt seen. Laying in bed resting. Very pleasant and talkative. Pt stated that he is exhausted d/t only sleeping for about an hour last night. Started out by talking about how hungry is, how much he eats and yet he continues to lose weight unintentionally. Has lost about 25# - 30# he believes but chart documentation shows a weight for two weeks ago and then not another weight in 2016. Pt stated that he ate a good (huge) breakfast and lunch today so far. Stated that he is a single dad of 2 kids. 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  Frustrated with the constant hunger and wt Labs    See Adult Nutrition Doc Flowsheet for more detail.      Electronically signed by Tamiko Orozco RD, EMILIO on 11/1/17 at 2:30 PM    Contact Number: [phone]

## 2017-11-01 NOTE — PROGRESS NOTES
Patient in bed, eyes closed. Head to toe assessment completed. Alert and oriented times 3. PERRL 4, stayed same. Upper extremities warm, pink and dry. Hand grasp strong, bilaterally. Capillary refill and skin turgor less than 3 seconds. Lung sounds clear. Chest expansion symmetrical. Respirations easy and unlabored. Heart sounds regular. Bowel sounds active in all four quadrants. Abdomen soft, non-tender. Lower extremities warm, dry and pink. Pedal push and pull strong, bilaterally. Pedal pulses +2, bilaterally. No numbness or tingling. Denies any pain. Walked to bathroom, standby assist. Overhead table and call light within reach.

## 2017-11-01 NOTE — PLAN OF CARE
Problem: Pain Control  Goal: Maintain pain level at or below patient's acceptable level (or 5 if patient is unable to determine acceptable level)  Outcome: Met This Shift      Problem: Cardiovascular  Goal: No DVT, peripheral vascular complications  Outcome: Met This Shift    Goal: Weight maintained or lost  Outcome: Ongoing  Good appetite, good intake. Trapazole started yesterday. Problem: GI  Goal: No bowel complications  Outcome: Met This Shift    Goal: Bowel movement at least every other day  Outcome: Met This Shift      Problem:   Goal: Adequate urinary output  Outcome: Met This Shift      Problem: Nutrition  Goal: Optimal nutrition therapy  Outcome: Ongoing  Good appetite, good intake. Hyperactive thyroid. Problem: Skin Integrity/Risk  Goal: No skin breakdown during hospitalization  Outcome: Met This Shift      Problem: Musculor/Skeletal Functional Status  Goal: Highest potential functional level  Outcome: Met This Shift      Problem: Falls - Risk of:  Goal: Will remain free from falls  Will remain free from falls   Outcome: Ongoing  Call light kept within reach. Hourly rounds made. Bed alarm in use. Comments: Care plan reviewed with patient. Patient verbalize understanding of the plan of care and contribute to goal setting.

## 2017-11-01 NOTE — PROGRESS NOTES
Patient lying in bed, eyes closed. Head to toe assessment complete. No changes from morning assessment. Denies any pain. Overhead table and call light within reach.

## 2017-11-01 NOTE — CARE COORDINATION
11/1/17, 9:07 AM      [address]       Admitted from: PCP 10/31/2017/ Yazmin Wolffimkalie 6 day: 1   Location: [address] [place] [address] Reason for admit: Thyrotoxicosis [E05.90]  Thyrotoxicosis, acute [E05.90] Status: IP  Admit order signed?: yes  PMH:  has a past medical history of Gout; Graves' disease with thyrotoxic crisis; Hyperthyroidism; and Thyroid disease. Procedure: none  Pertinent abnormal Imaging:none  Medications:  Scheduled Meds:   metoprolol tartrate  37.5 mg Oral BID    methimazole  20 mg Oral Daily    sodium chloride flush  10 mL Intravenous 2 times per day     Continuous Infusions:   sodium chloride 100 mL/hr at 11/01/17 0340      Pertinent Info/Orders/Treatment Plan:  IVF continued. Endocrinology following  Diet: DIET GENERAL;   DVT Prophylaxis: SCD's ordered  Smoking status:  reports that he has never smoked. He has never used smokeless tobacco.   Influenza Vaccination Screening Completed: yes, refused  Pneumonia Vaccination Screening Completed: no, reviewed core measure, updated Dilan Engel RN  Core measures: monitor  PCP: Marshall Girard MD  Readmission:   none  Risk Score: 2.5     Discharge Planning  Current Residence:  Private Residence  Living Arrangements:  Children   Support Systems:  Children, Family Members, [employer]  Current Services PTA:     Potential Assistance Needed:  Other (Comment)  Potential Assistance Purchasing Medications:  No  Does patient want to participate in local refill/ meds to beds program?  Yes  Type of Home Care Services:  None  Patient expects to be discharged to:  home  Expected Discharge date: Follow Up Appointment: Best Day/ Time:  (late afternoon/ any day)    Discharge Plan: met with client; denied needs as plans home with two sons when medically cleared     Evaluation: no    11/2/17, 2:10 PM      Discharge plan discussed by  and . Discharge plan reviewed with patient/ family.  Updated ZAIRA Bhatt CM  Patient/ family verbalize understanding of discharge plan and are in agreement with plan. Understanding was demonstrated using the teach back method.

## 2017-11-01 NOTE — CONSULTS
Inpatient consult to Endocrinology  Consult performed by: Jayden Marie ordered by: Sophia Stephenson                    Endocrinology Consultation Note      Patient:  Jp Cazares  YOB: 1970    MRN: [id number]     Acct: [de-identified]    Primary Care Physician: Mallory Ovalles MD    HISTORY OF PRESENT ILLNESS:   History obtained from chart review and the patient. The patient is a 40 y.o. male whom I have been requested to see by Dr. Maricruz Stokes for evaluation of abnormal thyroid function test. Patient being worked up as outpatient for hyperthyroidism. 10/17/2017 TSH 0.006, FT4 7.06, .52, . Thyroid uptake and scan completed with 80% uptake at 6 hours and 70.4% at 24 hours. Patient with complaints of heat intolerance, weight loss, palpitations, tremor, weakness and insomnia. Complaints of intermittent dysphagia. Denies anterior neck pain or dysphonia. Past Medical History:        Diagnosis Date    Gout 8/07   Shirleyedwin Valencia' disease with thyrotoxic crisis 10/25/2017    Hyperthyroidism 10/25/2017    Thyroid disease        Past Surgical History:        Procedure Laterality Date    SHOULDER SURGERY      right       Medications:    No current facility-administered medications on file prior to encounter. Current Outpatient Prescriptions on File Prior to Encounter   Medication Sig Dispense Refill    metoprolol tartrate (LOPRESSOR) 25 MG tablet Take 25 mg by mouth 2 times daily         Allergies:  Review of patient's allergies indicates no known allergies. Social History:    reports that he has never smoked. He has never used smokeless tobacco. He reports that he does not drink alcohol or use drugs. Family History:       Problem Relation Age of Onset    Mental Illness Mother     Diabetes Mother        Review of systems:  Constitutional: no fever, no night sweats, + fatigue + weight loss  Head: no headache, no head injury, no migranes.   Eye: no blurring Recent Labs      11/01/17   0839   WBC  5.4   HGB  13.5*   PLT  198     BMP:    Recent Labs      11/01/17   0839   NA  141   K  3.9   CL  104   CO2  27   BUN  10   CREATININE  0.5   GLUCOSE  242*     Calcium:  Recent Labs      11/01/17   0839   CALCIUM  9.4     Hepatic:   Recent Labs      11/01/17   0839   ALKPHOS  240*   ALT  23   AST  21   PROT  5.7*   BILITOT  0.4   LABALBU  3.2*       Assessment / Plan:    1. Hyperthyroidism with thyrotoxicosis secondary to Graves disease confirmed with elevated TSI and thyroid up take and scan. LFT's and WBC 10/17 were acceptable. Patient was started on Tapazole 20 mg daily on admission. He is tolerating without adverse reaction. Discussed risk of hepatotoxicity and suppressed wbc with patient and he accepts these risk and would like to proceed. Repeat TFT's, CMP and CBC with diff this AM.  2. Tachycardia secondary to #1. BB increased by primary team. Monitor. 3. Severe protein malnutrition. Dietician consulted. Thank you Dr. Josephus Sever for allowing us to participate in this patient's care. Case discussed with Dr. Anca Rogers. Additional recommendations will follow the clinical course.      Electronically signed by Sofi Mcintyre CNP on 11/1/2017 at 10:44 AM

## 2017-11-01 NOTE — PLAN OF CARE
Problem: Pain Control  Goal: Maintain pain level at or below patient's acceptable level (or 5 if patient is unable to determine acceptable level)  Patient denies pain this shift. Problem: Neurological  Goal: Maximum potential motor/sensory/cognitive function  Outcome: Completed Date Met: 10/31/17  Experiences occasional tremors in legs. Problem: Cardiovascular  Goal: No DVT, peripheral vascular complications  Outcome: Ongoing  Patient shows no signs or symptoms of DVT. Goal: Hemodynamic stability  Outcome: Met This Shift    Goal: Anticoagulate/Hct stable  Outcome: Met This Shift    Goal: Agreement to quit smoking  Outcome: Ongoing    Goal: Weight maintained or lost  Outcome: Ongoing  Patient has had significant weight loss due to hyperthyroidism. Goal: Understanding of dietary restrictions  Outcome: Ongoing  Patient is on general diet. Problem: GI  Goal: No bowel complications  Outcome: Ongoing  Patient states he has had no problems having regular bowel movements. Goal: Bowel movement at least every other day  Outcome: Ongoing  Patient reports he has not had any problems having regular bowel movements. Problem:   Goal: Adequate urinary output  Outcome: Met This Shift      Problem: Nutrition  Goal: Optimal nutrition therapy  Outcome: Ongoing  Patient is frequently hungry. Problem: Skin Integrity/Risk  Goal: No skin breakdown during hospitalization  Outcome: Ongoing  Patient has no new areas of compromised skin integrity. Goal: Wound healing  Outcome: Completed Date Met: 10/31/17      Problem: Musculor/Skeletal Functional Status  Goal: Absence of falls  Outcome: Ongoing  Patient is free of falls this shift. Comments: Care plan reviewed with patient. Patient verbalizes understanding of the plan of care and contribute to goal setting.

## 2017-11-02 VITALS
HEART RATE: 88 BPM | TEMPERATURE: 97.4 F | RESPIRATION RATE: 18 BRPM | OXYGEN SATURATION: 96 % | SYSTOLIC BLOOD PRESSURE: 142 MMHG | HEIGHT: 70 IN | BODY MASS INDEX: 23.05 KG/M2 | WEIGHT: 161 LBS | DIASTOLIC BLOOD PRESSURE: 67 MMHG

## 2017-11-02 PROCEDURE — G0378 HOSPITAL OBSERVATION PER HR: HCPCS

## 2017-11-02 PROCEDURE — 6370000000 HC RX 637 (ALT 250 FOR IP): Performed by: EMERGENCY MEDICINE

## 2017-11-02 PROCEDURE — 2580000003 HC RX 258: Performed by: EMERGENCY MEDICINE

## 2017-11-02 RX ORDER — METHIMAZOLE 10 MG/1
20 TABLET ORAL DAILY
Qty: 60 TABLET | Refills: 3 | Status: SHIPPED | OUTPATIENT
Start: 2017-11-02 | End: 2017-11-02

## 2017-11-02 RX ORDER — LORAZEPAM 1 MG/1
1 TABLET ORAL NIGHTLY PRN
Qty: 10 TABLET | Refills: 0 | OUTPATIENT
Start: 2017-11-02 | End: 2017-11-12

## 2017-11-02 RX ORDER — LORAZEPAM 1 MG/1
1 TABLET ORAL NIGHTLY PRN
Qty: 10 TABLET | Refills: 0 | Status: SHIPPED | OUTPATIENT
Start: 2017-11-02 | End: 2017-11-02

## 2017-11-02 RX ORDER — METHIMAZOLE 10 MG/1
20 TABLET ORAL DAILY
Qty: 60 TABLET | Refills: 3 | Status: SHIPPED | OUTPATIENT
Start: 2017-11-02 | End: 2017-11-21 | Stop reason: SDUPTHER

## 2017-11-02 RX ORDER — METOPROLOL TARTRATE 50 MG/1
50 TABLET, FILM COATED ORAL 2 TIMES DAILY
Qty: 60 TABLET | Refills: 3 | Status: SHIPPED | OUTPATIENT
Start: 2017-11-02 | End: 2017-11-21 | Stop reason: SDUPTHER

## 2017-11-02 RX ORDER — METOPROLOL TARTRATE 50 MG/1
50 TABLET, FILM COATED ORAL 2 TIMES DAILY
Qty: 60 TABLET | Refills: 3 | Status: SHIPPED | OUTPATIENT
Start: 2017-11-02 | End: 2017-11-02

## 2017-11-02 RX ADMIN — SODIUM CHLORIDE: 9 INJECTION, SOLUTION INTRAVENOUS at 05:41

## 2017-11-02 RX ADMIN — SODIUM CHLORIDE: 9 INJECTION, SOLUTION INTRAVENOUS at 01:00

## 2017-11-02 RX ADMIN — METOPROLOL TARTRATE 50 MG: 50 TABLET, FILM COATED ORAL at 11:07

## 2017-11-02 RX ADMIN — METHIMAZOLE 20 MG: 10 TABLET ORAL at 11:07

## 2017-11-02 NOTE — PROGRESS NOTES
Family Medicine Progress Notes/Coverage    Today's Date: 11/2/17  4K-26/[id number]-A  Medical Record # [id number]  Account # [de-identified]      Mr. Josee Fitzgerald admitted on 10/31/2017        Subjective / Interval History :     Have not slept all night inspite of the Ativan 1 mg  Feeling much better  Hungry all the time  No medication side effect  Reviewed notes, consults, laboratory and radiology results,    Objective:       Physical Exam:  Patient Vitals for the past 24 hrs:   BP Temp Temp src Pulse Resp SpO2 Weight   11/02/17 0428 (!) 150/77 98.2 °F (36.8 °C) Oral 103 18 97 % 161 lb (73 kg)   11/01/17 1942 (!) 160/73 98.3 °F (36.8 °C) Oral 107 18 97 % -   11/01/17 1518 136/80 98.5 °F (36.9 °C) Oral 104 18 98 % -   11/01/17 1115 (!) 127/58 98.2 °F (36.8 °C) Oral 111 18 99 % -   11/01/17 0853 134/64 97.9 °F (36.6 °C) Oral 110 18 97 % -       General Appearance:  Alert, cooperative, no distress, appears stated age   HEENT:  Neck: wnl     Chest/Lungs:  Heart: CTA  RRR   Abdomen:  Back: soft   Extremities:  Neurological Exam: No edema       Assessment:     Active Hospital Problems    Diagnosis Date Noted   Lisha Speed' disease with thyrotoxic crisis [E05.01] 10/25/2017     Priority: High    Hyperthyroidism [E05.90] 10/25/2017     Priority: High       Plan:     Discussed plans with the nursing staff  Discharge today if okay with Dr Stanislaw Mercer office in 7-10 days    Medications, Laboratories and Imaging results:    Scheduled Meds:   LORazepam  1 mg Oral Nightly    metoprolol tartrate  50 mg Oral BID    methimazole  20 mg Oral Daily    sodium chloride flush  10 mL Intravenous 2 times per day     Continuous Infusions:   sodium chloride 100 mL/hr at 11/02/17 0541     PRN Meds:sodium chloride flush, acetaminophen, ondansetron    Imaging:    Lab Review :    Lab Results   Component Value Date

## 2017-11-02 NOTE — PLAN OF CARE
Problem: DISCHARGE BARRIERS  Goal: Patient's continuum of care needs are met  Outcome: Completed Date Met: 11/02/17  Patient discharge home. See SW notes.

## 2017-11-09 NOTE — DISCHARGE SUMMARY
headache. No dizziness. Alert,  active, cooperative. VITAL SIGNS:  Blood pressure 128/70, temperature 97.4, respirations 16,  heart rate 112. Weight loss of 11 pounds since last week. HEAD:  Puffiness with tenderness on the left parotid area. There is mild  exophthalmos. NECK:  Supple. There is thyromegaly with tenderness on the anterior strap  muscles. Neck is soft. LUNGS:  Clear to auscultation. HEART:  Rate and rhythm tachycardic. No murmur. ABDOMEN:  Soft, depressible. EXTREMITIES:  No edema. HOSPITAL COURSE:  The patient was admitted directly from the office, was  started on IV fluids. Consult was done through Endocrinology service, Dr. Niesha Jaime. The patient was started on Tapazole 20 mg daily and increased  metoprolol to 37.5 mg b.i.d. The patient was placed on monitor bed,  telemetry and was followed. The patient stayed in the hospital for 2 days. The patient was feeling much better; however, he has been eating much  better. No medication side effect. However, he slept only couple of hours  a day. The patient however has been doing well and discharged improving on  11/02/2017. DONTRELL Fox M.D.    D: 11/08/2017 11:21:18       T: 11/08/2017 14:50:25     [email or web address]  Job#: [id number]     Doc#: [id number]

## 2017-11-13 ENCOUNTER — HOSPITAL ENCOUNTER (OUTPATIENT)
Age: 47
Discharge: HOME | End: 2017-11-13
Payer: COMMERCIAL

## 2017-11-13 ENCOUNTER — OFFICE VISIT (OUTPATIENT)
Dept: ENDOCRINOLOGY | Age: 47
End: 2017-11-13
Payer: COMMERCIAL

## 2017-11-13 VITALS
HEIGHT: 70 IN | HEART RATE: 94 BPM | DIASTOLIC BLOOD PRESSURE: 75 MMHG | RESPIRATION RATE: 16 BRPM | WEIGHT: 166 LBS | BODY MASS INDEX: 23.77 KG/M2 | SYSTOLIC BLOOD PRESSURE: 140 MMHG

## 2017-11-13 DIAGNOSIS — R73.9 HYPERGLYCEMIA: ICD-10-CM

## 2017-11-13 DIAGNOSIS — E05.90 HYPERTHYROIDISM: Primary | ICD-10-CM

## 2017-11-13 DIAGNOSIS — E05.90 HYPERTHYROIDISM: ICD-10-CM

## 2017-11-13 DIAGNOSIS — E05.01 GRAVES' DISEASE WITH THYROTOXIC CRISIS: ICD-10-CM

## 2017-11-13 LAB
ALBUMIN SERPL-MCNC: 4.3 G/DL (ref 3.5–5.1)
ALP BLD-CCNC: 295 U/L (ref 38–126)
ALT SERPL-CCNC: 30 U/L (ref 11–66)
ANION GAP SERPL CALCULATED.3IONS-SCNC: 15 MEQ/L (ref 8–16)
AST SERPL-CCNC: 27 U/L (ref 5–40)
AVERAGE GLUCOSE: 129 MG/DL (ref 70–126)
BILIRUB SERPL-MCNC: 0.6 MG/DL (ref 0.3–1.2)
BUN BLDV-MCNC: 9 MG/DL (ref 7–22)
CALCIUM SERPL-MCNC: 9.6 MG/DL (ref 8.5–10.5)
CHLORIDE BLD-SCNC: 102 MEQ/L (ref 98–111)
CO2: 24 MEQ/L (ref 23–33)
CREAT SERPL-MCNC: 0.6 MG/DL (ref 0.4–1.2)
GFR SERPL CREATININE-BSD FRML MDRD: > 90 ML/MIN/1.73M2
GLUCOSE BLD-MCNC: 158 MG/DL (ref 70–108)
HBA1C MFR BLD: 6.3 % (ref 4.4–6.4)
POTASSIUM SERPL-SCNC: 4.4 MEQ/L (ref 3.5–5.2)
SODIUM BLD-SCNC: 141 MEQ/L (ref 135–145)
T4 FREE: 1.32 NG/DL (ref 0.93–1.76)
TOTAL PROTEIN: 7.4 G/DL (ref 6.1–8)
TSH SERPL DL<=0.05 MIU/L-ACNC: 0.01 UIU/ML (ref 0.4–4.2)

## 2017-11-13 PROCEDURE — 84439 ASSAY OF FREE THYROXINE: CPT

## 2017-11-13 PROCEDURE — G8484 FLU IMMUNIZE NO ADMIN: HCPCS | Performed by: INTERNAL MEDICINE

## 2017-11-13 PROCEDURE — 84443 ASSAY THYROID STIM HORMONE: CPT

## 2017-11-13 PROCEDURE — 36415 COLL VENOUS BLD VENIPUNCTURE: CPT

## 2017-11-13 PROCEDURE — 83036 HEMOGLOBIN GLYCOSYLATED A1C: CPT

## 2017-11-13 PROCEDURE — 1111F DSCHRG MED/CURRENT MED MERGE: CPT | Performed by: INTERNAL MEDICINE

## 2017-11-13 PROCEDURE — 84481 FREE ASSAY (FT-3): CPT

## 2017-11-13 PROCEDURE — 99214 OFFICE O/P EST MOD 30 MIN: CPT | Performed by: INTERNAL MEDICINE

## 2017-11-13 PROCEDURE — G8427 DOCREV CUR MEDS BY ELIG CLIN: HCPCS | Performed by: INTERNAL MEDICINE

## 2017-11-13 PROCEDURE — 1036F TOBACCO NON-USER: CPT | Performed by: INTERNAL MEDICINE

## 2017-11-13 PROCEDURE — G8420 CALC BMI NORM PARAMETERS: HCPCS | Performed by: INTERNAL MEDICINE

## 2017-11-13 PROCEDURE — 80053 COMPREHEN METABOLIC PANEL: CPT

## 2017-11-13 NOTE — PROGRESS NOTES
50 MG tablet Take 1 tablet by mouth 2 times daily 60 tablet 3    methimazole (TAPAZOLE) 10 MG tablet Take 2 tablets by mouth daily 60 tablet 3    diphenhydrAMINE (BENADRYL) 25 MG capsule Take 25 mg by mouth daily as needed for Itching       No current facility-administered medications for this visit. No Known Allergies  Health Maintenance   Topic Date Due    HIV screen  12/23/1986    Lipid screen  12/23/2011    Flu vaccine (1) 09/01/2017    DTaP/Tdap/Td vaccine (2 - Td) 08/05/2027       Labs  No results found for: LABA1C  No results found for: EAG  Lab Results   Component Value Date    TSH 0.006 (L) 11/01/2017     No results found for: CHOL  No results found for: TRIG  No results found for: HDL  No results found for: LDLCALC, LDLCHOLESTEROL  No results found for: LABVLDL, VLDL  No results found for: CHOLHDLRATIO      Review of Systems  Constitutional: negative for chills, fatigue and fevers  Eyes: negative for irritation, redness and visual disturbance  Respiratory: negative for cough, shortness of breath and wheezing  Cardiovascular: negative for chest pain, irregular heart beat and palpitations    The remainder of systems were reviewed and negative.      Objective:   BP (!) 140/75   Pulse 94   Resp 16   Ht 5' 10\" (1.778 m)   Wt 166 lb (75.3 kg)   BMI 23.82 kg/m²     General:  alert, appears stated age, cooperative and no distress   Oropharynx: normal findings: lips normal without lesions, buccal mucosa normal, gums healthy and tongue midline and normal    Eyes:  negative findings: lids and lashes normal, conjunctivae and sclerae normal, corneas clear and pupils equal, round, reactive to light and accomodation       Neck: no adenopathy, no carotid bruit, no JVD and supple, symmetrical, trachea midline   Thyroid:  80 gram diffuse goiter, non tender without bruits   Lung: clear to auscultation bilaterally   Heart:  regular rate and rhythm, S1, S2 normal, no murmur, click, rub or gallop and normal apical impulse   Abdomen: soft, non-tender; bowel sounds normal; no masses,  no organomegaly   Extremities: extremities normal, atraumatic, no cyanosis or edema and Homans sign is negative, no sign of DVT   Pulses: 2+ and symmetric   Skin: warm and dry, no hyperpigmentation, vitiligo, or suspicious lesions   Neuro: normal without focal findings, mental status, speech normal, alert and oriented x3, BECCA, cranial nerves 2-12 intact, muscle tone and strength normal and symmetric. Assessment/Plan:     1. Hyperthyroidism: Due to Graves' disease. Patient has noted some improvement since he got on the Tapazole. I would like to get levels to evaluate his biochemical status at this point. We discussed potential side effects of Tapazole including hepatotoxicity and neutropenia. He accepts these risks. 2. Graves' disease with thyrotoxic crisis: At this point there is no significant skin or eye involvement. Continue to monitor. 3. Hyperglycemia: Etiology is not known. To be followed. He does not have signs or symptoms of diabetes mellitus. I'd like to get an A1c. Orders Placed This Encounter   Procedures    TSH without Reflex     Standing Status:   Future     Standing Expiration Date:   11/13/2018    T4, Free     Standing Status:   Future     Standing Expiration Date:   11/13/2018    T3, Free     Standing Status:   Future     Standing Expiration Date:   11/13/2018    Comprehensive Metabolic Panel     Standing Status:   Future     Standing Expiration Date:   11/13/2018    Hemoglobin A1C     Standing Status:   Future     Standing Expiration Date:   11/13/2018       · The risks and benefits of my recommendations, as well as other treatment options were discussed with the patient today. Questions were answered. · Follow up: 3 months and as needed. Electronically signed by Artie Blas MD on 11/13/17 at 2:50 PM    **This report has been created using voice recognition software.  It may contain minor errors which are inherent in voice recognition technology. **

## 2017-11-13 NOTE — LETTER
Endocrine Diabetes [facility]  [address]  [address] [place] [address]  Phone: [phone]  Fax: [phone]    Arsalan Moctezuma MD      11/13/17    Dr. Candace Lutz    Patient: Nevaeh Castaneda  MR Number: [id number]  YOB: 1970  Date of Visit: 11/13/2017      Dear Dr. Candace Lutz    Thank you for the request for consultation for Nevaeh Castaneda to me for the evaluation of   Chief Complaint   Patient presents with    Follow[facility] from [facility]' disease with thyrotoxic,Hyperthyroidism    New Patient      referred   . Below are the relevant portions of my assessment and plan of care. Subjective:   Hyperthyroidism  Patient presents with hyperthyroidism. 73-year-old male being followed for hypothyroidism due to Graves' disease. The patient was recently in the hospital and at that time was quite tachycardic. He was started on 20 mg of Tapazole daily which he is taking consistently. Symptoms include heat intolerance, poor sleeping, weight loss, increased stool frequency. These symptoms have improved since he got on the Tapazole. They are of moderate severity at this point. The patient also reports dryness of the eyes with no redness or visual blurriness or diplopia. He gets intermittent retro-orbital pressure. Symptoms have been present for several months. Family history includes no thyroid abnormalities. Please note that this patient had elevated blood sugar of 242 on 1 November. He gives no history of diabetes. He denies polyuria and polydipsia. These labs have not been pursued.     Past Medical History:   Diagnosis Date    Gout 8/07   Judithann [place]' disease with thyrotoxic crisis 10/25/2017    Hyperthyroidism 10/25/2017    Thyroid disease       Past Surgical History:   Procedure Laterality Date    SHOULDER SURGERY      right       Family History   Problem Relation Age of Onset    Mental Illness Mother     Diabetes Mother Neck: no adenopathy, no carotid bruit, no JVD and supple, symmetrical, trachea midline   Thyroid:  80 gram diffuse goiter, non tender without bruits   Lung: clear to auscultation bilaterally   Heart:  regular rate and rhythm, S1, S2 normal, no murmur, click, rub or gallop and normal apical impulse   Abdomen: soft, non-tender; bowel sounds normal; no masses,  no organomegaly   Extremities: extremities normal, atraumatic, no cyanosis or edema and Homans sign is negative, no sign of DVT   Pulses: 2+ and symmetric   Skin: warm and dry, no hyperpigmentation, vitiligo, or suspicious lesions   Neuro: normal without focal findings, mental status, speech normal, alert and oriented x3, BECCA, cranial nerves 2-12 intact, muscle tone and strength normal and symmetric. Assessment/Plan:     1. Hyperthyroidism: Due to Graves' disease. Patient has noted some improvement since he got on the Tapazole. I would like to get levels to evaluate his biochemical status at this point. We discussed potential side effects of Tapazole including hepatotoxicity and neutropenia. He accepts these risks. 2. Graves' disease with thyrotoxic crisis: At this point there is no significant skin or eye involvement. Continue to monitor. 3. Hyperglycemia: Etiology is not known. To be followed. He does not have signs or symptoms of diabetes mellitus. I'd like to get an A1c.        Orders Placed This Encounter   Procedures    TSH without Reflex     Standing Status:   Future     Standing Expiration Date:   11/13/2018    T4, Free     Standing Status:   Future     Standing Expiration Date:   11/13/2018    T3, Free     Standing Status:   Future     Standing Expiration Date:   11/13/2018    Comprehensive Metabolic Panel     Standing Status:   Future     Standing Expiration Date:   11/13/2018    Hemoglobin A1C     Standing Status:   Future     Standing Expiration Date:   11/13/2018 · The risks and benefits of my recommendations, as well as other treatment options were discussed with the patient today. Questions were answered. · Follow up: 3 months and as needed. If you have questions, please do not hesitate to call me. I look forward to following Pineda along with you.     Sincerely,        Tennille Stauffer MD

## 2017-11-14 LAB — T3 FREE: 5.04 PG/ML (ref 2.02–4.43)

## 2017-11-16 RX ORDER — INDOMETHACIN 50 MG/1
50 CAPSULE ORAL
Qty: 30 CAPSULE | Refills: 0 | Status: SHIPPED | OUTPATIENT
Start: 2017-11-16 | End: 2017-11-21 | Stop reason: SDUPTHER

## 2017-11-21 ENCOUNTER — OFFICE VISIT (OUTPATIENT)
Dept: FAMILY MEDICINE CLINIC | Age: 47
End: 2017-11-21

## 2017-11-21 VITALS
SYSTOLIC BLOOD PRESSURE: 124 MMHG | DIASTOLIC BLOOD PRESSURE: 80 MMHG | WEIGHT: 176.4 LBS | HEART RATE: 78 BPM | RESPIRATION RATE: 16 BRPM | BODY MASS INDEX: 25.25 KG/M2 | HEIGHT: 70 IN

## 2017-11-21 DIAGNOSIS — E05.90 HYPERTHYROIDISM: Primary | ICD-10-CM

## 2017-11-21 DIAGNOSIS — G47.00 INSOMNIA, UNSPECIFIED TYPE: ICD-10-CM

## 2017-11-21 PROCEDURE — 99214 OFFICE O/P EST MOD 30 MIN: CPT | Performed by: EMERGENCY MEDICINE

## 2017-11-21 RX ORDER — METOPROLOL TARTRATE 50 MG/1
50 TABLET, FILM COATED ORAL 2 TIMES DAILY
Qty: 120 TABLET | Refills: 1 | Status: SHIPPED | OUTPATIENT
Start: 2017-11-21 | End: 2018-02-27 | Stop reason: SDUPTHER

## 2017-11-21 RX ORDER — ESZOPICLONE 2 MG/1
2 TABLET, FILM COATED ORAL NIGHTLY
Qty: 30 TABLET | Refills: 0 | Status: SHIPPED | OUTPATIENT
Start: 2017-11-21 | End: 2018-11-21

## 2017-11-21 RX ORDER — INDOMETHACIN 50 MG/1
50 CAPSULE ORAL
Qty: 30 CAPSULE | Refills: 0 | Status: SHIPPED | OUTPATIENT
Start: 2017-11-21

## 2017-11-21 RX ORDER — METHIMAZOLE 10 MG/1
20 TABLET ORAL DAILY
Qty: 120 TABLET | Refills: 1 | Status: SHIPPED | OUTPATIENT
Start: 2017-11-21 | End: 2018-02-27 | Stop reason: SDUPTHER

## 2017-11-21 ASSESSMENT — ENCOUNTER SYMPTOMS
DIARRHEA: 0
TROUBLE SWALLOWING: 0
CHEST TIGHTNESS: 0
WHEEZING: 0
VOMITING: 0
COUGH: 0
BACK PAIN: 0
SORE THROAT: 0
SHORTNESS OF BREATH: 0
ABDOMINAL PAIN: 0
CONSTIPATION: 0
RHINORRHEA: 0
SINUS PRESSURE: 0
VOICE CHANGE: 0
NAUSEA: 0

## 2017-11-21 NOTE — PROGRESS NOTES
Visit Date: 11/21/2017    Subjective:    Elissa Bethea is a 40 y.o. male who presents today for:  Chief Complaint   Patient presents with    Follow-Up from [facility]         HPI:     HPI     Feeling much better. Patient had seen the endocrinologist Dr. Sherel Mcburney and scheduled for follow-up in 3 months    Still not sleeping well at night. He finished the Ativan and help him. Sleeps 4-5 hours, before he sleeps 1-2 hour a day    Not losing weight and eating better    Taking Lopressor and Tapazole , and Benadryl for allergy as needed. His gout is better    Patient wanted a refill of medication as he is going to [place] due to work for more done one month      Current Home Medications:  Current Outpatient Prescriptions   Medication Sig Dispense Refill    indomethacin (INDOCIN) 50 MG capsule Take 1 capsule by mouth 3 times daily (with meals) 30 capsule 0    metoprolol tartrate (LOPRESSOR) 50 MG tablet Take 1 tablet by mouth 2 times daily 120 tablet 1    methimazole (TAPAZOLE) 10 MG tablet Take 2 tablets by mouth daily 120 tablet 1    eszopiclone (LUNESTA) 2 MG TABS Take 1 tablet by mouth nightly . 30 tablet 0    diphenhydrAMINE (BENADRYL) 25 MG capsule Take 25 mg by mouth daily as needed for Itching       No current facility-administered medications for this visit. Subjective:      Review of Systems   Constitutional: Negative for appetite change, chills, diaphoresis, fatigue and fever. HENT: Negative for congestion, ear discharge, ear pain, postnasal drip, rhinorrhea, sinus pressure, sore throat, trouble swallowing and voice change. Respiratory: Negative for cough, chest tightness, shortness of breath and wheezing. Cardiovascular: Negative for chest pain, palpitations and leg swelling. Gastrointestinal: Negative for abdominal pain, constipation, diarrhea, nausea and vomiting.    Musculoskeletal: Negative for arthralgias, back pain, joint swelling, myalgias, neck pain and neck stiffness. Skin: Negative for rash. Neurological: Negative for dizziness, syncope, weakness, light-headedness, numbness and headaches. Objective:     /80   Pulse 78   Resp 16   Ht 5' 10\" (1.778 m)   Wt 176 lb 6.4 oz (80 kg)   BMI 25.31 kg/m²   BP Readings from Last 3 Encounters:   11/21/17 124/80   11/13/17 (!) 140/75   11/02/17 (!) 142/67     Wt Readings from Last 3 Encounters:   11/21/17 176 lb 6.4 oz (80 kg)   11/13/17 166 lb (75.3 kg)   11/02/17 161 lb (73 kg)       Physical Exam   Constitutional: He is oriented to person, place, and time. He appears well-developed and well-nourished. He is cooperative. HENT:   Head: Normocephalic and atraumatic. Right Ear: External ear normal.   Left Ear: External ear normal.   Nose: Nose normal.   Mouth/Throat: Oropharynx is clear and moist.   Eyes: Conjunctivae and EOM are normal. Pupils are equal, round, and reactive to light. No scleral icterus. Neck: Normal range of motion. Neck supple. No JVD present. No thyromegaly present. Cardiovascular: Normal rate, regular rhythm and intact distal pulses. Exam reveals no friction rub. No murmur heard. Pulmonary/Chest: Effort normal and breath sounds normal. He has no wheezes. He has no rales. He exhibits no tenderness. Abdominal: Soft. Bowel sounds are normal. He exhibits no mass. There is no tenderness. Musculoskeletal: He exhibits no edema. Lymphadenopathy:     He has no cervical adenopathy. Neurological: He is alert and oriented to person, place, and time. Skin: No rash noted. Vitals reviewed. Assessment:        1. Hyperthyroidism     2.  Insomnia, unspecified type         Plan:      Medications Prescribed:  Orders Placed This Encounter   Medications    indomethacin (INDOCIN) 50 MG capsule     Sig: Take 1 capsule by mouth 3 times daily (with meals)     Dispense:  30 capsule     Refill:  0    metoprolol tartrate (LOPRESSOR) 50 MG tablet     Sig: Take 1 tablet by mouth 2 times

## 2017-11-22 ENCOUNTER — TELEPHONE (OUTPATIENT)
Dept: ENDOCRINOLOGY | Age: 47
End: 2017-11-22

## 2017-11-22 DIAGNOSIS — E05.90 HYPERTHYROIDISM: Primary | ICD-10-CM

## 2017-11-22 DIAGNOSIS — E05.01 GRAVES' DISEASE WITH THYROTOXIC CRISIS: ICD-10-CM

## 2017-11-22 NOTE — TELEPHONE ENCOUNTER
----- Message from Renard Valdes MD sent at 11/21/2017  8:21 PM EST -----  Repeat free T4, free T3 and TSH in a month.

## 2018-01-09 ENCOUNTER — TELEPHONE (OUTPATIENT)
Dept: ENDOCRINOLOGY | Age: 48
End: 2018-01-09

## 2018-01-09 DIAGNOSIS — E05.90 HYPERTHYROIDISM: Primary | ICD-10-CM

## 2018-01-09 DIAGNOSIS — R73.9 HYPERGLYCEMIA: ICD-10-CM

## 2018-01-10 NOTE — TELEPHONE ENCOUNTER
I spoke with patient regarding labs from the last visit. We need to recheck his thyroid, BMP and A1c. The patient is currently in [place]. Please fax lab slip to him, to have labs done ASAP and send report back here.

## 2018-02-27 RX ORDER — METHIMAZOLE 10 MG/1
20 TABLET ORAL DAILY
Qty: 120 TABLET | Refills: 1 | Status: SHIPPED | OUTPATIENT
Start: 2018-02-27 | End: 2018-08-31 | Stop reason: SDUPTHER

## 2018-02-27 RX ORDER — METOPROLOL TARTRATE 50 MG/1
50 TABLET, FILM COATED ORAL 2 TIMES DAILY
Qty: 120 TABLET | Refills: 1 | Status: SHIPPED | OUTPATIENT
Start: 2018-02-27 | End: 2018-08-31 | Stop reason: SDUPTHER

## 2018-02-27 NOTE — TELEPHONE ENCOUNTER
Date of last visit:  11/21/2017  Date of next visit:  Visit date not found    Requested Prescriptions      No prescriptions requested or ordered in this encounter

## 2018-08-31 RX ORDER — METHIMAZOLE 10 MG/1
20 TABLET ORAL DAILY
Qty: 120 TABLET | Refills: 0 | Status: SHIPPED | OUTPATIENT
Start: 2018-08-31

## 2018-08-31 RX ORDER — METOPROLOL TARTRATE 50 MG/1
50 TABLET, FILM COATED ORAL 2 TIMES DAILY
Qty: 120 TABLET | Refills: 0 | Status: SHIPPED | OUTPATIENT
Start: 2018-08-31

## 2018-08-31 NOTE — TELEPHONE ENCOUNTER
Pt called and req 90 day refill:    Methimazole 10 mg ii po qd  Metoprolol Tartrate 50 mg I po bid    Please send to :    [address]

## 2018-09-04 NOTE — TELEPHONE ENCOUNTER
Pt informed. He stated that he is in [place] for work. He does not know when he will be back to [place]. He stated that he would call back to schedule.

## 2024-09-10 NOTE — PROGRESS NOTES
Ok to discharge from endocrine standpoint. Home with current regimen of Tapazole and BB. Repeat Labs in 1 week. Follow up in the clinic in 1 week.     Tadeo Javed, CNP iso poor po intake   - added K to IV fluids   - monitor k

## 2025-04-17 NOTE — TELEPHONE ENCOUNTER
I called patient as we have not heard back from him and what lab he wants to use for ASAP orders. Patient states that he is in [place] and not familiar with the area. He promised to call office back within a hour with name of lab to send order. Lesions present on face today. 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 Detail Level: Detailed